# Patient Record
Sex: FEMALE | Race: BLACK OR AFRICAN AMERICAN | NOT HISPANIC OR LATINO | URBAN - METROPOLITAN AREA
[De-identification: names, ages, dates, MRNs, and addresses within clinical notes are randomized per-mention and may not be internally consistent; named-entity substitution may affect disease eponyms.]

---

## 2017-05-11 ENCOUNTER — EMERGENCY (EMERGENCY)
Facility: HOSPITAL | Age: 27
LOS: 1 days | Discharge: HOME | End: 2017-05-11
Attending: EMERGENCY MEDICINE | Admitting: EMERGENCY MEDICINE
Payer: MEDICAID

## 2017-05-11 VITALS
RESPIRATION RATE: 16 BRPM | SYSTOLIC BLOOD PRESSURE: 119 MMHG | OXYGEN SATURATION: 100 % | HEART RATE: 82 BPM | DIASTOLIC BLOOD PRESSURE: 71 MMHG | TEMPERATURE: 98 F

## 2017-05-11 VITALS
OXYGEN SATURATION: 100 % | DIASTOLIC BLOOD PRESSURE: 64 MMHG | TEMPERATURE: 99 F | SYSTOLIC BLOOD PRESSURE: 102 MMHG | RESPIRATION RATE: 16 BRPM | HEART RATE: 82 BPM

## 2017-05-11 LAB
ALBUMIN SERPL ELPH-MCNC: 4 G/DL — SIGNIFICANT CHANGE UP (ref 3.3–5)
ALP SERPL-CCNC: 66 U/L — SIGNIFICANT CHANGE UP (ref 40–120)
ALT FLD-CCNC: 9 U/L — SIGNIFICANT CHANGE UP (ref 4–33)
AST SERPL-CCNC: 16 U/L — SIGNIFICANT CHANGE UP (ref 4–32)
BASOPHILS # BLD AUTO: 0.02 K/UL — SIGNIFICANT CHANGE UP (ref 0–0.2)
BASOPHILS NFR BLD AUTO: 0.3 % — SIGNIFICANT CHANGE UP (ref 0–2)
BILIRUB SERPL-MCNC: 0.3 MG/DL — SIGNIFICANT CHANGE UP (ref 0.2–1.2)
BUN SERPL-MCNC: 9 MG/DL — SIGNIFICANT CHANGE UP (ref 7–23)
CALCIUM SERPL-MCNC: 9.2 MG/DL — SIGNIFICANT CHANGE UP (ref 8.4–10.5)
CHLORIDE SERPL-SCNC: 99 MMOL/L — SIGNIFICANT CHANGE UP (ref 98–107)
CO2 SERPL-SCNC: 23 MMOL/L — SIGNIFICANT CHANGE UP (ref 22–31)
CREAT SERPL-MCNC: 0.8 MG/DL — SIGNIFICANT CHANGE UP (ref 0.5–1.3)
EOSINOPHIL # BLD AUTO: 0.06 K/UL — SIGNIFICANT CHANGE UP (ref 0–0.5)
EOSINOPHIL NFR BLD AUTO: 0.8 % — SIGNIFICANT CHANGE UP (ref 0–6)
GLUCOSE SERPL-MCNC: 74 MG/DL — SIGNIFICANT CHANGE UP (ref 70–99)
HCG SERPL-ACNC: SIGNIFICANT CHANGE UP MIU/ML
HCT VFR BLD CALC: 37.7 % — SIGNIFICANT CHANGE UP (ref 34.5–45)
HGB BLD-MCNC: 12.7 G/DL — SIGNIFICANT CHANGE UP (ref 11.5–15.5)
IMM GRANULOCYTES NFR BLD AUTO: 0.3 % — SIGNIFICANT CHANGE UP (ref 0–1.5)
LYMPHOCYTES # BLD AUTO: 2.77 K/UL — SIGNIFICANT CHANGE UP (ref 1–3.3)
LYMPHOCYTES # BLD AUTO: 37.6 % — SIGNIFICANT CHANGE UP (ref 13–44)
MCHC RBC-ENTMCNC: 32.2 PG — SIGNIFICANT CHANGE UP (ref 27–34)
MCHC RBC-ENTMCNC: 33.7 % — SIGNIFICANT CHANGE UP (ref 32–36)
MCV RBC AUTO: 95.4 FL — SIGNIFICANT CHANGE UP (ref 80–100)
MONOCYTES # BLD AUTO: 0.36 K/UL — SIGNIFICANT CHANGE UP (ref 0–0.9)
MONOCYTES NFR BLD AUTO: 4.9 % — SIGNIFICANT CHANGE UP (ref 2–14)
NEUTROPHILS # BLD AUTO: 4.14 K/UL — SIGNIFICANT CHANGE UP (ref 1.8–7.4)
NEUTROPHILS NFR BLD AUTO: 56.1 % — SIGNIFICANT CHANGE UP (ref 43–77)
PLATELET # BLD AUTO: 358 K/UL — SIGNIFICANT CHANGE UP (ref 150–400)
PMV BLD: 9.6 FL — SIGNIFICANT CHANGE UP (ref 7–13)
POTASSIUM SERPL-MCNC: 3.8 MMOL/L — SIGNIFICANT CHANGE UP (ref 3.5–5.3)
POTASSIUM SERPL-SCNC: 3.8 MMOL/L — SIGNIFICANT CHANGE UP (ref 3.5–5.3)
PROT SERPL-MCNC: 7.6 G/DL — SIGNIFICANT CHANGE UP (ref 6–8.3)
RBC # BLD: 3.95 M/UL — SIGNIFICANT CHANGE UP (ref 3.8–5.2)
RBC # FLD: 13.3 % — SIGNIFICANT CHANGE UP (ref 10.3–14.5)
SODIUM SERPL-SCNC: 136 MMOL/L — SIGNIFICANT CHANGE UP (ref 135–145)
WBC # BLD: 7.37 K/UL — SIGNIFICANT CHANGE UP (ref 3.8–10.5)
WBC # FLD AUTO: 7.37 K/UL — SIGNIFICANT CHANGE UP (ref 3.8–10.5)

## 2017-05-11 PROCEDURE — 76830 TRANSVAGINAL US NON-OB: CPT | Mod: 26

## 2017-05-11 PROCEDURE — 99284 EMERGENCY DEPT VISIT MOD MDM: CPT

## 2017-05-11 RX ORDER — SODIUM CHLORIDE 9 MG/ML
1000 INJECTION INTRAMUSCULAR; INTRAVENOUS; SUBCUTANEOUS ONCE
Qty: 0 | Refills: 0 | Status: COMPLETED | OUTPATIENT
Start: 2017-05-11 | End: 2017-05-11

## 2017-05-11 RX ORDER — METOCLOPRAMIDE HCL 10 MG
10 TABLET ORAL ONCE
Qty: 0 | Refills: 0 | Status: COMPLETED | OUTPATIENT
Start: 2017-05-11 | End: 2017-05-11

## 2017-05-11 RX ADMIN — Medication 10 MILLIGRAM(S): at 23:43

## 2017-05-11 RX ADMIN — SODIUM CHLORIDE 1000 MILLILITER(S): 9 INJECTION INTRAMUSCULAR; INTRAVENOUS; SUBCUTANEOUS at 23:43

## 2017-05-11 NOTE — ED ADULT NURSE NOTE - OBJECTIVE STATEMENT
pt. received in room 9 , A&Ox3 c/o left sided pelvic pain . PT. states she is 13 weeks pregnant and a hx. of miscarriages. Pt. states she has spotting yesterday and denies any spotting right now. Pt. Vitals as noted, and in no acute distress. Pt. IV placed on left ac , labs drawn and sent. Pt. in ultrasound , will continue to monitor while in the ED.

## 2017-05-11 NOTE — ED PROVIDER NOTE - PROGRESS NOTE DETAILS
All results d/w patient and copies given with instructions to bring with them to their follow up appointment.  The patient was given verbal and written discharge instructions Specifically, instructions when to return to the ED and to seek follow-up from their pcp within 1-2 days. Any specialty follow-up was discussed, including how to make an appointment.  Instructions were discussed in simple, plain language and was understood by the patient. The patient understands that should their symptoms worsen or any new symptoms arise, they should return to the ED immediately for further evaluation.  Vss, NAD, tolerating po and ambulating steadily at discharge. Has appt with ob in am

## 2017-05-11 NOTE — ED PROVIDER NOTE - OBJECTIVE STATEMENT
27f presents with vaginal bleeding, now resolved, and abd cramping, , 13 weeks pregnant.  Bleeding started a few days ago, now resolved, today developed abd cramping, lower abdomen, b/l, worse on left side.  Has had persistent vomiting over last few weeks with some generalized weakness. Had us in this pregnancy with iup on us.

## 2017-05-11 NOTE — ED PROVIDER NOTE - ATTENDING CONTRIBUTION TO CARE
27F  at 13wks presents with abdominal cramping and vaginal spotting.  Bleeding stopped today, but had been ongoing for several days.  Lower abd cramping, worse on left.  Has had an u/s with IUP during this pregnancy.  ongoing nausea and vomiting, not worse w cramping.  On exam well appearing, nad, mmm, lungs clear, rrr, abd soft, os closed, no rash, no edema, 2+ pulses, alert, speech clear.  Plan  for labs, u/s, UA.

## 2017-05-11 NOTE — ED PROVIDER NOTE - MEDICAL DECISION MAKING DETAILS
27f, 13 weeks preg w/ confirmed iup, previous bleeding and current cramping, also with previous daily vomiting, will check labs, ua, hydration, antiemetic, ultrasound, reassess.

## 2017-05-11 NOTE — ED ADULT TRIAGE NOTE - CHIEF COMPLAINT QUOTE
Pt st " I am 13 weeks pregnant and having cramping."" I was bleeding alittle 4 days ago...but no bleeding today. HX of [place] x2.

## 2017-05-12 LAB
APPEARANCE UR: SIGNIFICANT CHANGE UP
BILIRUB UR-MCNC: NEGATIVE — SIGNIFICANT CHANGE UP
BLD GP AB SCN SERPL QL: NEGATIVE — SIGNIFICANT CHANGE UP
BLOOD UR QL VISUAL: NEGATIVE — SIGNIFICANT CHANGE UP
COLOR SPEC: YELLOW — SIGNIFICANT CHANGE UP
GLUCOSE UR-MCNC: NEGATIVE — SIGNIFICANT CHANGE UP
KETONES UR-MCNC: NEGATIVE — SIGNIFICANT CHANGE UP
LEUKOCYTE ESTERASE UR-ACNC: NEGATIVE — SIGNIFICANT CHANGE UP
MUCOUS THREADS # UR AUTO: SIGNIFICANT CHANGE UP
NITRITE UR-MCNC: NEGATIVE — SIGNIFICANT CHANGE UP
PH UR: 7 — SIGNIFICANT CHANGE UP (ref 4.6–8)
PROT UR-MCNC: 30 — HIGH
RBC CASTS # UR COMP ASSIST: SIGNIFICANT CHANGE UP (ref 0–?)
RH IG SCN BLD-IMP: POSITIVE — SIGNIFICANT CHANGE UP
SP GR SPEC: 1.03 — HIGH (ref 1–1.03)
SQUAMOUS # UR AUTO: SIGNIFICANT CHANGE UP
UROBILINOGEN FLD QL: NORMAL E.U. — SIGNIFICANT CHANGE UP (ref 0.1–0.2)
WBC UR QL: SIGNIFICANT CHANGE UP (ref 0–?)

## 2017-09-25 ENCOUNTER — OUTPATIENT (OUTPATIENT)
Dept: OUTPATIENT SERVICES | Facility: HOSPITAL | Age: 27
LOS: 1 days | End: 2017-09-25
Payer: MEDICAID

## 2017-09-25 DIAGNOSIS — Z3A.00 WEEKS OF GESTATION OF PREGNANCY NOT SPECIFIED: ICD-10-CM

## 2017-09-25 DIAGNOSIS — O26.899 OTHER SPECIFIED PREGNANCY RELATED CONDITIONS, UNSPECIFIED TRIMESTER: ICD-10-CM

## 2017-09-25 LAB
APPEARANCE UR: CLEAR — SIGNIFICANT CHANGE UP
BASOPHILS # BLD AUTO: 0.1 K/UL — SIGNIFICANT CHANGE UP (ref 0–0.2)
BASOPHILS NFR BLD AUTO: 0.7 % — SIGNIFICANT CHANGE UP (ref 0–2)
BILIRUB UR-MCNC: NEGATIVE — SIGNIFICANT CHANGE UP
COLOR SPEC: YELLOW — SIGNIFICANT CHANGE UP
DIFF PNL FLD: NEGATIVE — SIGNIFICANT CHANGE UP
EOSINOPHIL # BLD AUTO: 0 K/UL — SIGNIFICANT CHANGE UP (ref 0–0.5)
EOSINOPHIL NFR BLD AUTO: 0.2 % — SIGNIFICANT CHANGE UP (ref 0–6)
GLUCOSE UR QL: NEGATIVE — SIGNIFICANT CHANGE UP
HCT VFR BLD CALC: 31.1 % — LOW (ref 34.5–45)
HGB BLD-MCNC: 10.5 G/DL — LOW (ref 11.5–15.5)
KETONES UR-MCNC: NEGATIVE — SIGNIFICANT CHANGE UP
LEUKOCYTE ESTERASE UR-ACNC: ABNORMAL
LYMPHOCYTES # BLD AUTO: 1.9 K/UL — SIGNIFICANT CHANGE UP (ref 1–3.3)
LYMPHOCYTES # BLD AUTO: 23.8 % — SIGNIFICANT CHANGE UP (ref 13–44)
MCHC RBC-ENTMCNC: 31.7 PG — SIGNIFICANT CHANGE UP (ref 27–34)
MCHC RBC-ENTMCNC: 33.7 GM/DL — SIGNIFICANT CHANGE UP (ref 32–36)
MCV RBC AUTO: 94 FL — SIGNIFICANT CHANGE UP (ref 80–100)
MONOCYTES # BLD AUTO: 0.6 K/UL — SIGNIFICANT CHANGE UP (ref 0–0.9)
MONOCYTES NFR BLD AUTO: 7.3 % — SIGNIFICANT CHANGE UP (ref 2–14)
NEUTROPHILS # BLD AUTO: 5.3 K/UL — SIGNIFICANT CHANGE UP (ref 1.8–7.4)
NEUTROPHILS NFR BLD AUTO: 68 % — SIGNIFICANT CHANGE UP (ref 43–77)
NITRITE UR-MCNC: NEGATIVE — SIGNIFICANT CHANGE UP
PH UR: 6.5 — SIGNIFICANT CHANGE UP (ref 5–8)
PLATELET # BLD AUTO: 339 K/UL — SIGNIFICANT CHANGE UP (ref 150–400)
PROT UR-MCNC: NEGATIVE — SIGNIFICANT CHANGE UP
RBC # BLD: 3.31 M/UL — LOW (ref 3.8–5.2)
RBC # FLD: 12.2 % — SIGNIFICANT CHANGE UP (ref 10.3–14.5)
SP GR SPEC: 1.01 — SIGNIFICANT CHANGE UP (ref 1.01–1.02)
UROBILINOGEN FLD QL: 1
WBC # BLD: 7.8 K/UL — SIGNIFICANT CHANGE UP (ref 3.8–10.5)
WBC # FLD AUTO: 7.8 K/UL — SIGNIFICANT CHANGE UP (ref 3.8–10.5)

## 2017-09-25 PROCEDURE — G0463: CPT

## 2017-09-25 PROCEDURE — 81001 URINALYSIS AUTO W/SCOPE: CPT

## 2017-09-25 PROCEDURE — 85027 COMPLETE CBC AUTOMATED: CPT

## 2017-09-25 PROCEDURE — 59025 FETAL NON-STRESS TEST: CPT

## 2017-09-25 RX ORDER — SODIUM CHLORIDE 9 MG/ML
1000 INJECTION, SOLUTION INTRAVENOUS
Qty: 0 | Refills: 0 | Status: DISCONTINUED | OUTPATIENT
Start: 2017-09-25 | End: 2017-10-10

## 2017-09-25 RX ORDER — METRONIDAZOLE 7.5 MG/G
1 GEL VAGINAL
Qty: 1 | Refills: 0 | OUTPATIENT
Start: 2017-09-25 | End: 2017-09-30

## 2017-11-10 ENCOUNTER — OUTPATIENT (OUTPATIENT)
Dept: OUTPATIENT SERVICES | Facility: HOSPITAL | Age: 27
LOS: 1 days | End: 2017-11-10
Payer: MEDICAID

## 2017-11-10 DIAGNOSIS — Z3A.00 WEEKS OF GESTATION OF PREGNANCY NOT SPECIFIED: ICD-10-CM

## 2017-11-10 DIAGNOSIS — O26.899 OTHER SPECIFIED PREGNANCY RELATED CONDITIONS, UNSPECIFIED TRIMESTER: ICD-10-CM

## 2017-11-10 PROCEDURE — 99213 OFFICE O/P EST LOW 20 MIN: CPT

## 2017-11-13 ENCOUNTER — INPATIENT (INPATIENT)
Facility: HOSPITAL | Age: 27
LOS: 1 days | Discharge: HOME | End: 2017-11-15
Attending: OBSTETRICS & GYNECOLOGY | Admitting: OBSTETRICS & GYNECOLOGY

## 2017-11-13 VITALS — WEIGHT: 218.26 LBS | HEIGHT: 71 IN

## 2017-11-13 DIAGNOSIS — O26.90 PREGNANCY RELATED CONDITIONS, UNSPECIFIED, UNSPECIFIED TRIMESTER: ICD-10-CM

## 2017-11-13 DIAGNOSIS — Z3A.00 WEEKS OF GESTATION OF PREGNANCY NOT SPECIFIED: ICD-10-CM

## 2017-11-13 LAB
BASOPHILS # BLD AUTO: 0.03 K/UL — SIGNIFICANT CHANGE UP (ref 0–0.2)
BASOPHILS NFR BLD AUTO: 0.4 % — SIGNIFICANT CHANGE UP (ref 0–2)
BLD GP AB SCN SERPL QL: NEGATIVE — SIGNIFICANT CHANGE UP
EOSINOPHIL # BLD AUTO: 0.1 K/UL — SIGNIFICANT CHANGE UP (ref 0–0.5)
EOSINOPHIL NFR BLD AUTO: 1.4 % — SIGNIFICANT CHANGE UP (ref 0–6)
HCT VFR BLD CALC: 30.4 % — LOW (ref 34.5–45)
HGB BLD-MCNC: 9.8 G/DL — LOW (ref 11.5–15.5)
IMM GRANULOCYTES # BLD AUTO: 0.05 # — SIGNIFICANT CHANGE UP
IMM GRANULOCYTES NFR BLD AUTO: 0.7 % — SIGNIFICANT CHANGE UP (ref 0–1.5)
LYMPHOCYTES # BLD AUTO: 1.93 K/UL — SIGNIFICANT CHANGE UP (ref 1–3.3)
LYMPHOCYTES # BLD AUTO: 26.1 % — SIGNIFICANT CHANGE UP (ref 13–44)
MCHC RBC-ENTMCNC: 30.1 PG — SIGNIFICANT CHANGE UP (ref 27–34)
MCHC RBC-ENTMCNC: 32.2 % — SIGNIFICANT CHANGE UP (ref 32–36)
MCV RBC AUTO: 93.3 FL — SIGNIFICANT CHANGE UP (ref 80–100)
MONOCYTES # BLD AUTO: 0.62 K/UL — SIGNIFICANT CHANGE UP (ref 0–0.9)
MONOCYTES NFR BLD AUTO: 8.4 % — SIGNIFICANT CHANGE UP (ref 2–14)
NEUTROPHILS # BLD AUTO: 4.66 K/UL — SIGNIFICANT CHANGE UP (ref 1.8–7.4)
NEUTROPHILS NFR BLD AUTO: 63 % — SIGNIFICANT CHANGE UP (ref 43–77)
NRBC # FLD: 0 — SIGNIFICANT CHANGE UP
PLATELET # BLD AUTO: 379 K/UL — SIGNIFICANT CHANGE UP (ref 150–400)
PMV BLD: 10 FL — SIGNIFICANT CHANGE UP (ref 7–13)
RBC # BLD: 3.26 M/UL — LOW (ref 3.8–5.2)
RBC # FLD: 14.4 % — SIGNIFICANT CHANGE UP (ref 10.3–14.5)
RH IG SCN BLD-IMP: POSITIVE — SIGNIFICANT CHANGE UP
WBC # BLD: 7.39 K/UL — SIGNIFICANT CHANGE UP (ref 3.8–10.5)
WBC # FLD AUTO: 7.39 K/UL — SIGNIFICANT CHANGE UP (ref 3.8–10.5)

## 2017-11-13 RX ORDER — OXYTOCIN 10 UNIT/ML
333.33 VIAL (ML) INJECTION
Qty: 20 | Refills: 0 | Status: COMPLETED | OUTPATIENT
Start: 2017-11-13

## 2017-11-13 RX ORDER — IBUPROFEN 200 MG
600 TABLET ORAL EVERY 6 HOURS
Qty: 0 | Refills: 0 | Status: DISCONTINUED | OUTPATIENT
Start: 2017-11-13 | End: 2017-11-15

## 2017-11-13 RX ORDER — SODIUM CHLORIDE 9 MG/ML
1000 INJECTION, SOLUTION INTRAVENOUS ONCE
Qty: 0 | Refills: 0 | Status: DISCONTINUED | OUTPATIENT
Start: 2017-11-13 | End: 2017-11-13

## 2017-11-13 RX ORDER — HYDROCORTISONE 1 %
1 OINTMENT (GRAM) TOPICAL EVERY 4 HOURS
Qty: 0 | Refills: 0 | Status: DISCONTINUED | OUTPATIENT
Start: 2017-11-13 | End: 2017-11-13

## 2017-11-13 RX ORDER — IBUPROFEN 200 MG
600 TABLET ORAL EVERY 6 HOURS
Qty: 0 | Refills: 0 | Status: COMPLETED | OUTPATIENT
Start: 2017-11-13 | End: 2018-10-12

## 2017-11-13 RX ORDER — OXYCODONE HYDROCHLORIDE 5 MG/1
5 TABLET ORAL EVERY 4 HOURS
Qty: 0 | Refills: 0 | Status: DISCONTINUED | OUTPATIENT
Start: 2017-11-13 | End: 2017-11-15

## 2017-11-13 RX ORDER — AMPICILLIN TRIHYDRATE 250 MG
2 CAPSULE ORAL ONCE
Qty: 0 | Refills: 0 | Status: COMPLETED | OUTPATIENT
Start: 2017-11-13 | End: 2017-11-13

## 2017-11-13 RX ORDER — PRAMOXINE HYDROCHLORIDE 150 MG/15G
1 AEROSOL, FOAM RECTAL EVERY 4 HOURS
Qty: 0 | Refills: 0 | Status: DISCONTINUED | OUTPATIENT
Start: 2017-11-13 | End: 2017-11-13

## 2017-11-13 RX ORDER — SODIUM CHLORIDE 9 MG/ML
3 INJECTION INTRAMUSCULAR; INTRAVENOUS; SUBCUTANEOUS EVERY 8 HOURS
Qty: 0 | Refills: 0 | Status: DISCONTINUED | OUTPATIENT
Start: 2017-11-13 | End: 2017-11-15

## 2017-11-13 RX ORDER — GLYCERIN ADULT
1 SUPPOSITORY, RECTAL RECTAL AT BEDTIME
Qty: 0 | Refills: 0 | Status: DISCONTINUED | OUTPATIENT
Start: 2017-11-13 | End: 2017-11-15

## 2017-11-13 RX ORDER — AER TRAVELER 0.5 G/1
1 SOLUTION RECTAL; TOPICAL EVERY 4 HOURS
Qty: 0 | Refills: 0 | Status: DISCONTINUED | OUTPATIENT
Start: 2017-11-13 | End: 2017-11-15

## 2017-11-13 RX ORDER — OXYCODONE HYDROCHLORIDE 5 MG/1
5 TABLET ORAL
Qty: 0 | Refills: 0 | Status: DISCONTINUED | OUTPATIENT
Start: 2017-11-13 | End: 2017-11-15

## 2017-11-13 RX ORDER — LANOLIN
1 OINTMENT (GRAM) TOPICAL EVERY 6 HOURS
Qty: 0 | Refills: 0 | Status: DISCONTINUED | OUTPATIENT
Start: 2017-11-13 | End: 2017-11-15

## 2017-11-13 RX ORDER — HYDROCORTISONE 1 %
1 OINTMENT (GRAM) TOPICAL EVERY 4 HOURS
Qty: 0 | Refills: 0 | Status: DISCONTINUED | OUTPATIENT
Start: 2017-11-13 | End: 2017-11-14

## 2017-11-13 RX ORDER — ACETAMINOPHEN 500 MG
975 TABLET ORAL EVERY 6 HOURS
Qty: 0 | Refills: 0 | Status: DISCONTINUED | OUTPATIENT
Start: 2017-11-13 | End: 2017-11-15

## 2017-11-13 RX ORDER — SODIUM CHLORIDE 9 MG/ML
1000 INJECTION, SOLUTION INTRAVENOUS
Qty: 0 | Refills: 0 | Status: DISCONTINUED | OUTPATIENT
Start: 2017-11-13 | End: 2017-11-13

## 2017-11-13 RX ORDER — MAGNESIUM HYDROXIDE 400 MG/1
30 TABLET, CHEWABLE ORAL
Qty: 0 | Refills: 0 | Status: DISCONTINUED | OUTPATIENT
Start: 2017-11-13 | End: 2017-11-15

## 2017-11-13 RX ORDER — DOCUSATE SODIUM 100 MG
100 CAPSULE ORAL
Qty: 0 | Refills: 0 | Status: DISCONTINUED | OUTPATIENT
Start: 2017-11-13 | End: 2017-11-14

## 2017-11-13 RX ORDER — OXYTOCIN 10 UNIT/ML
41.67 VIAL (ML) INJECTION
Qty: 20 | Refills: 0 | Status: DISCONTINUED | OUTPATIENT
Start: 2017-11-13 | End: 2017-11-14

## 2017-11-13 RX ORDER — SODIUM CHLORIDE 9 MG/ML
3 INJECTION INTRAMUSCULAR; INTRAVENOUS; SUBCUTANEOUS EVERY 8 HOURS
Qty: 0 | Refills: 0 | Status: DISCONTINUED | OUTPATIENT
Start: 2017-11-13 | End: 2017-11-13

## 2017-11-13 RX ORDER — ACETAMINOPHEN 500 MG
975 TABLET ORAL EVERY 6 HOURS
Qty: 0 | Refills: 0 | Status: COMPLETED | OUTPATIENT
Start: 2017-11-13 | End: 2018-10-12

## 2017-11-13 RX ORDER — OXYTOCIN 10 UNIT/ML
333.33 VIAL (ML) INJECTION
Qty: 20 | Refills: 0 | Status: COMPLETED | OUTPATIENT
Start: 2017-11-13 | End: 2017-11-13

## 2017-11-13 RX ORDER — SIMETHICONE 80 MG/1
80 TABLET, CHEWABLE ORAL EVERY 6 HOURS
Qty: 0 | Refills: 0 | Status: DISCONTINUED | OUTPATIENT
Start: 2017-11-13 | End: 2017-11-15

## 2017-11-13 RX ORDER — DIBUCAINE 1 %
1 OINTMENT (GRAM) RECTAL EVERY 4 HOURS
Qty: 0 | Refills: 0 | Status: DISCONTINUED | OUTPATIENT
Start: 2017-11-13 | End: 2017-11-15

## 2017-11-13 RX ORDER — AMPICILLIN TRIHYDRATE 250 MG
CAPSULE ORAL
Qty: 0 | Refills: 0 | Status: DISCONTINUED | OUTPATIENT
Start: 2017-11-13 | End: 2017-11-13

## 2017-11-13 RX ORDER — PRAMOXINE HYDROCHLORIDE 150 MG/15G
1 AEROSOL, FOAM RECTAL EVERY 4 HOURS
Qty: 0 | Refills: 0 | Status: DISCONTINUED | OUTPATIENT
Start: 2017-11-13 | End: 2017-11-14

## 2017-11-13 RX ORDER — DIBUCAINE 1 %
1 OINTMENT (GRAM) RECTAL EVERY 4 HOURS
Qty: 0 | Refills: 0 | Status: DISCONTINUED | OUTPATIENT
Start: 2017-11-13 | End: 2017-11-13

## 2017-11-13 RX ORDER — TETANUS TOXOID, REDUCED DIPHTHERIA TOXOID AND ACELLULAR PERTUSSIS VACCINE, ADSORBED 5; 2.5; 8; 8; 2.5 [IU]/.5ML; [IU]/.5ML; UG/.5ML; UG/.5ML; UG/.5ML
0.5 SUSPENSION INTRAMUSCULAR ONCE
Qty: 0 | Refills: 0 | Status: DISCONTINUED | OUTPATIENT
Start: 2017-11-13 | End: 2017-11-15

## 2017-11-13 RX ORDER — INFLUENZA VIRUS VACCINE 15; 15; 15; 15 UG/.5ML; UG/.5ML; UG/.5ML; UG/.5ML
0.5 SUSPENSION INTRAMUSCULAR ONCE
Qty: 0 | Refills: 0 | Status: COMPLETED | OUTPATIENT
Start: 2017-11-13 | End: 2017-11-13

## 2017-11-13 RX ORDER — AER TRAVELER 0.5 G/1
1 SOLUTION RECTAL; TOPICAL EVERY 4 HOURS
Qty: 0 | Refills: 0 | Status: DISCONTINUED | OUTPATIENT
Start: 2017-11-13 | End: 2017-11-13

## 2017-11-13 RX ORDER — AMPICILLIN TRIHYDRATE 250 MG
1 CAPSULE ORAL EVERY 4 HOURS
Qty: 0 | Refills: 0 | Status: DISCONTINUED | OUTPATIENT
Start: 2017-11-13 | End: 2017-11-13

## 2017-11-13 RX ORDER — DIPHENHYDRAMINE HCL 50 MG
25 CAPSULE ORAL EVERY 6 HOURS
Qty: 0 | Refills: 0 | Status: DISCONTINUED | OUTPATIENT
Start: 2017-11-13 | End: 2017-11-15

## 2017-11-13 RX ORDER — OXYTOCIN 10 UNIT/ML
41.67 VIAL (ML) INJECTION
Qty: 20 | Refills: 0 | Status: DISCONTINUED | OUTPATIENT
Start: 2017-11-13 | End: 2017-11-13

## 2017-11-13 RX ORDER — KETOROLAC TROMETHAMINE 30 MG/ML
30 SYRINGE (ML) INJECTION ONCE
Qty: 0 | Refills: 0 | Status: DISCONTINUED | OUTPATIENT
Start: 2017-11-13 | End: 2017-11-13

## 2017-11-13 RX ADMIN — Medication 125 MILLIUNIT(S)/MIN: at 08:44

## 2017-11-13 RX ADMIN — Medication 975 MILLIGRAM(S): at 11:10

## 2017-11-13 RX ADMIN — Medication 975 MILLIGRAM(S): at 21:45

## 2017-11-13 RX ADMIN — Medication 600 MILLIGRAM(S): at 18:05

## 2017-11-13 RX ADMIN — SODIUM CHLORIDE 3 MILLILITER(S): 9 INJECTION INTRAMUSCULAR; INTRAVENOUS; SUBCUTANEOUS at 12:40

## 2017-11-13 RX ADMIN — Medication 1000 MILLIUNIT(S)/MIN: at 08:20

## 2017-11-13 RX ADMIN — SODIUM CHLORIDE 3 MILLILITER(S): 9 INJECTION INTRAMUSCULAR; INTRAVENOUS; SUBCUTANEOUS at 22:04

## 2017-11-13 RX ADMIN — Medication 975 MILLIGRAM(S): at 21:15

## 2017-11-13 RX ADMIN — OXYCODONE HYDROCHLORIDE 5 MILLIGRAM(S): 5 TABLET ORAL at 21:15

## 2017-11-13 RX ADMIN — OXYCODONE HYDROCHLORIDE 5 MILLIGRAM(S): 5 TABLET ORAL at 13:15

## 2017-11-13 RX ADMIN — OXYCODONE HYDROCHLORIDE 5 MILLIGRAM(S): 5 TABLET ORAL at 12:35

## 2017-11-13 RX ADMIN — Medication 30 MILLIGRAM(S): at 09:20

## 2017-11-13 RX ADMIN — Medication 30 MILLIGRAM(S): at 09:04

## 2017-11-13 RX ADMIN — Medication 216 GRAM(S): at 05:59

## 2017-11-13 RX ADMIN — Medication 600 MILLIGRAM(S): at 19:00

## 2017-11-13 RX ADMIN — OXYCODONE HYDROCHLORIDE 5 MILLIGRAM(S): 5 TABLET ORAL at 21:45

## 2017-11-13 RX ADMIN — Medication 975 MILLIGRAM(S): at 11:40

## 2017-11-14 ENCOUNTER — TRANSCRIPTION ENCOUNTER (OUTPATIENT)
Age: 27
End: 2017-11-14

## 2017-11-14 LAB — T PALLIDUM AB TITR SER: NEGATIVE — SIGNIFICANT CHANGE UP

## 2017-11-14 RX ORDER — DOCUSATE SODIUM 100 MG
100 CAPSULE ORAL THREE TIMES A DAY
Qty: 0 | Refills: 0 | Status: DISCONTINUED | OUTPATIENT
Start: 2017-11-14 | End: 2017-11-15

## 2017-11-14 RX ORDER — FERROUS SULFATE 325(65) MG
325 TABLET ORAL
Qty: 0 | Refills: 0 | Status: DISCONTINUED | OUTPATIENT
Start: 2017-11-14 | End: 2017-11-15

## 2017-11-14 RX ORDER — DOCUSATE SODIUM 100 MG
100 CAPSULE ORAL
Qty: 0 | Refills: 0 | Status: DISCONTINUED | OUTPATIENT
Start: 2017-11-14 | End: 2017-11-14

## 2017-11-14 RX ORDER — ASCORBIC ACID 60 MG
500 TABLET,CHEWABLE ORAL DAILY
Qty: 0 | Refills: 0 | Status: DISCONTINUED | OUTPATIENT
Start: 2017-11-14 | End: 2017-11-15

## 2017-11-14 RX ADMIN — Medication 325 MILLIGRAM(S): at 14:12

## 2017-11-14 RX ADMIN — Medication 1 TABLET(S): at 14:12

## 2017-11-14 RX ADMIN — Medication 975 MILLIGRAM(S): at 15:00

## 2017-11-14 RX ADMIN — Medication 600 MILLIGRAM(S): at 22:58

## 2017-11-14 RX ADMIN — Medication 975 MILLIGRAM(S): at 14:11

## 2017-11-14 RX ADMIN — Medication 600 MILLIGRAM(S): at 11:20

## 2017-11-14 RX ADMIN — Medication 100 MILLIGRAM(S): at 11:02

## 2017-11-14 RX ADMIN — Medication 600 MILLIGRAM(S): at 22:17

## 2017-11-14 RX ADMIN — Medication 100 MILLIGRAM(S): at 06:22

## 2017-11-14 RX ADMIN — Medication 600 MILLIGRAM(S): at 02:00

## 2017-11-14 RX ADMIN — Medication 975 MILLIGRAM(S): at 22:13

## 2017-11-14 RX ADMIN — Medication 600 MILLIGRAM(S): at 01:57

## 2017-11-14 RX ADMIN — Medication 975 MILLIGRAM(S): at 22:58

## 2017-11-14 RX ADMIN — Medication 325 MILLIGRAM(S): at 08:30

## 2017-11-14 RX ADMIN — SODIUM CHLORIDE 3 MILLILITER(S): 9 INJECTION INTRAMUSCULAR; INTRAVENOUS; SUBCUTANEOUS at 06:23

## 2017-11-14 RX ADMIN — Medication 500 MILLIGRAM(S): at 14:12

## 2017-11-14 RX ADMIN — Medication 600 MILLIGRAM(S): at 10:40

## 2017-11-14 NOTE — DISCHARGE NOTE OB - MATERIALS PROVIDED
Breastfeeding Mother’s Support Group Information/Guide to Postpartum Care/Shaken Baby Prevention Handout/Birth Certificate Instructions/Breastfeeding Log/Back To Sleep Handout/[place]  Immunization Record/Bottle Feeding Log/[facility] [place] Screening Program/[facility] Hearing Screen Program

## 2017-11-14 NOTE — DISCHARGE NOTE OB - MEDICATION SUMMARY - MEDICATIONS TO STOP TAKING
I will STOP taking the medications listed below when I get home from the hospital:    MetroGel-Vaginal 0.75% vaginal gel with applicator  -- 1 applicatorful vaginally 2 times a day   -- For vaginal use.

## 2017-11-14 NOTE — DISCHARGE NOTE OB - CARE PROVIDER_API CALL
Ana M Akers), Obstetrics and Gynecology  [address]  [address]  [place] [address]  Phone: [phone]  Fax: [phone]

## 2017-11-14 NOTE — DISCHARGE NOTE OB - PATIENT PORTAL LINK FT
“You can access the [facility] Patient Portal, offered by [facility], by registering with the following website: [email or web address]”

## 2017-11-15 VITALS
OXYGEN SATURATION: 100 % | TEMPERATURE: 98 F | SYSTOLIC BLOOD PRESSURE: 117 MMHG | RESPIRATION RATE: 18 BRPM | HEART RATE: 80 BPM | DIASTOLIC BLOOD PRESSURE: 72 MMHG

## 2017-11-15 RX ORDER — ACETAMINOPHEN 500 MG
3 TABLET ORAL
Qty: 0 | Refills: 0 | DISCHARGE
Start: 2017-11-15

## 2017-11-15 RX ORDER — IBUPROFEN 200 MG
1 TABLET ORAL
Qty: 0 | Refills: 0 | DISCHARGE
Start: 2017-11-15

## 2017-11-15 NOTE — PROGRESS NOTE ADULT - PROBLEM SELECTOR PLAN 1
- Pain well controlled, continue current pain regimen  - Increase ambulation, SCDs when not ambulating  - Continue colace   - Continue regular diet    Maria Victoria Blanco D.O. PGY1
- Pain well controlled, continue current pain regimen  - Increase ambulation, SCDs when not ambulating  - Continue regular diet  - Discharge planning     Maria Victoria Blanco DO PGY1

## 2017-11-15 NOTE — PROGRESS NOTE ADULT - SUBJECTIVE AND OBJECTIVE BOX
OB Progress Note:  PPD#1    S: 26yo PPD#1 s/p . Patient feels well. Pain is well controlled. She is tolerating a regular diet and not yet passing flatus. She is voiding spontaneously, and ambulating without difficulty. Denies CP/SOB. Denies lightheadedness/dizziness. Denies N/V.    O:  Vitals:  Vital Signs Last 24 Hrs  T(C): 36.7 (2017 05:58), Max: 37.2 (2017 10:45)  T(F): 98 (2017 05:58), Max: 98.9 (2017 10:45)  HR: 81 (2017 05:58) (78 - 90)  BP: 114/70 (2017 05:58) (113/70 - 141/89)  BP(mean): --  RR: 18 (2017 05:58) (17 - 18)  SpO2: 100% (2017 05:58) (98% - 100%)    MEDICATIONS  (STANDING):  acetaminophen   Tablet. 975 milliGRAM(s) Oral every 6 hours  ascorbic acid 500 milliGRAM(s) Oral daily  diphtheria/tetanus/pertussis (acellular) Vaccine (ADAcel) 0.5 milliLiter(s) IntraMuscular once  docusate sodium 100 milliGRAM(s) Oral three times a day  ferrous    sulfate 325 milliGRAM(s) Oral three times a day with meals  ibuprofen  Tablet 600 milliGRAM(s) Oral every 6 hours  oxyCODONE    IR 5 milliGRAM(s) Oral every 3 hours  prenatal multivitamin 1 Tablet(s) Oral daily  sodium chloride 0.9% lock flush 3 milliLiter(s) IV Push every 8 hours      Labs:  Blood type: O Positive  Rubella IgG: RPR: Negative                          9.8<L>   7.39 >-----------< 379    ( 13 @ 06:40 )             30.4<L>                  Physical Exam:  General: NAD  Abdomen: soft, non-tender, non-distended, fundus firm  Vaginal: Lochia wnl  Extremities: NTBL
OB Progress Note:  PPD#2    S: 28yo  PPD#2 s/p . Patient feels well. Pain is well controlled. She is tolerating a regular diet and passing flatus. She is voiding spontaneously, and ambulating without difficulty. Denies CP/SOB. Denies lightheadedness/dizziness. Denies N/V.    O:  Vitals:   Vital Signs Last 24 Hrs  T(C): 36.4 (15 Nov 2017 05:17), Max: 36.8 (2017 18:14)  T(F): 97.5 (15 Nov 2017 05:17), Max: 98.2 (2017 18:14)  HR: 80 (15 Nov 2017 05:17) (77 - 80)  BP: 117/72 (15 Nov 2017 05:17) (117/72 - 122/67)  BP(mean): --  RR: 18 (15 Nov 2017 05:17) (17 - 18)  SpO2: 100% (15 Nov 2017 05:17) (100% - 100%)    MEDICATIONS  (STANDING):  acetaminophen   Tablet. 975 milliGRAM(s) Oral every 6 hours  ascorbic acid 500 milliGRAM(s) Oral daily  diphtheria/tetanus/pertussis (acellular) Vaccine (ADAcel) 0.5 milliLiter(s) IntraMuscular once  docusate sodium 100 milliGRAM(s) Oral three times a day  ferrous    sulfate 325 milliGRAM(s) Oral three times a day with meals  ibuprofen  Tablet 600 milliGRAM(s) Oral every 6 hours  oxyCODONE    IR 5 milliGRAM(s) Oral every 3 hours  prenatal multivitamin 1 Tablet(s) Oral daily  sodium chloride 0.9% lock flush 3 milliLiter(s) IV Push every 8 hours    MEDICATIONS  (PRN):  dibucaine 1% Ointment 1 Application(s) Topical every 4 hours PRN Perineal Discomfort  diphenhydrAMINE   Capsule 25 milliGRAM(s) Oral every 6 hours PRN Itching  glycerin Suppository - Adult 1 Suppository(s) Rectal at bedtime PRN Constipation  lanolin Ointment 1 Application(s) Topical every 6 hours PRN Sore Nipples  magnesium hydroxide Suspension 30 milliLiter(s) Oral two times a day PRN Constipation  oxyCODONE    IR 5 milliGRAM(s) Oral every 4 hours PRN Severe Pain (7 -10)  simethicone 80 milliGRAM(s) Chew every 6 hours PRN Gas  witch hazel Pads 1 Application(s) Topical every 4 hours PRN Perineal Discomfort      Labs:  Blood type: O Positive  Rubella IgG: RPR: Negative                          9.8<L>   7.39 >-----------< 379    ( 11-13 @ 06:40 )             30.4<L>                  Physical Exam:  General: NAD  Abdomen: soft, non-tender, non-distended, fundus firm  Vaginal: Lochia wnl  Extremities: NTBL

## 2019-01-04 NOTE — PROGRESS NOTE ADULT - ASSESSMENT
A/P: 28yo  PPD#1 s/p .
A/P: 26yo  PPD#2 s/p .  Patient is stable and doing well post-partum.
EKG completed

## 2019-02-15 ENCOUNTER — APPOINTMENT (OUTPATIENT)
Dept: ANTEPARTUM | Facility: CLINIC | Age: 29
End: 2019-02-15
Payer: COMMERCIAL

## 2019-02-15 ENCOUNTER — ASOB RESULT (OUTPATIENT)
Age: 29
End: 2019-02-15

## 2019-02-15 PROCEDURE — 76813 OB US NUCHAL MEAS 1 GEST: CPT

## 2019-02-15 PROCEDURE — 76801 OB US < 14 WKS SINGLE FETUS: CPT

## 2019-02-15 PROCEDURE — 36416 COLLJ CAPILLARY BLOOD SPEC: CPT

## 2019-04-22 ENCOUNTER — APPOINTMENT (OUTPATIENT)
Dept: ANTEPARTUM | Facility: CLINIC | Age: 29
End: 2019-04-22
Payer: MEDICAID

## 2019-04-22 ENCOUNTER — ASOB RESULT (OUTPATIENT)
Age: 29
End: 2019-04-22

## 2019-04-22 PROCEDURE — 76805 OB US >/= 14 WKS SNGL FETUS: CPT

## 2019-08-23 ENCOUNTER — TRANSCRIPTION ENCOUNTER (OUTPATIENT)
Age: 29
End: 2019-08-23

## 2019-08-23 ENCOUNTER — INPATIENT (INPATIENT)
Facility: HOSPITAL | Age: 29
LOS: 1 days | Discharge: HOME | End: 2019-08-25
Attending: OBSTETRICS & GYNECOLOGY | Admitting: OBSTETRICS & GYNECOLOGY

## 2019-08-23 VITALS
HEART RATE: 84 BPM | TEMPERATURE: 98 F | SYSTOLIC BLOOD PRESSURE: 115 MMHG | DIASTOLIC BLOOD PRESSURE: 72 MMHG | RESPIRATION RATE: 16 BRPM

## 2019-08-23 DIAGNOSIS — Z98.890 OTHER SPECIFIED POSTPROCEDURAL STATES: Chronic | ICD-10-CM

## 2019-08-23 DIAGNOSIS — O26.899 OTHER SPECIFIED PREGNANCY RELATED CONDITIONS, UNSPECIFIED TRIMESTER: ICD-10-CM

## 2019-08-23 DIAGNOSIS — Z3A.00 WEEKS OF GESTATION OF PREGNANCY NOT SPECIFIED: ICD-10-CM

## 2019-08-23 LAB
BASOPHILS # BLD AUTO: 0.03 K/UL — SIGNIFICANT CHANGE UP (ref 0–0.2)
BASOPHILS NFR BLD AUTO: 0.5 % — SIGNIFICANT CHANGE UP (ref 0–2)
BLD GP AB SCN SERPL QL: NEGATIVE — SIGNIFICANT CHANGE UP
EOSINOPHIL # BLD AUTO: 0.04 K/UL — SIGNIFICANT CHANGE UP (ref 0–0.5)
EOSINOPHIL NFR BLD AUTO: 0.6 % — SIGNIFICANT CHANGE UP (ref 0–6)
HCT VFR BLD CALC: 35.4 % — SIGNIFICANT CHANGE UP (ref 34.5–45)
HGB BLD-MCNC: 11.2 G/DL — LOW (ref 11.5–15.5)
IMM GRANULOCYTES NFR BLD AUTO: 0.3 % — SIGNIFICANT CHANGE UP (ref 0–1.5)
LYMPHOCYTES # BLD AUTO: 2.14 K/UL — SIGNIFICANT CHANGE UP (ref 1–3.3)
LYMPHOCYTES # BLD AUTO: 34.2 % — SIGNIFICANT CHANGE UP (ref 13–44)
MCHC RBC-ENTMCNC: 28.7 PG — SIGNIFICANT CHANGE UP (ref 27–34)
MCHC RBC-ENTMCNC: 31.6 % — LOW (ref 32–36)
MCV RBC AUTO: 90.8 FL — SIGNIFICANT CHANGE UP (ref 80–100)
MONOCYTES # BLD AUTO: 0.63 K/UL — SIGNIFICANT CHANGE UP (ref 0–0.9)
MONOCYTES NFR BLD AUTO: 10.1 % — SIGNIFICANT CHANGE UP (ref 2–14)
NEUTROPHILS # BLD AUTO: 3.39 K/UL — SIGNIFICANT CHANGE UP (ref 1.8–7.4)
NEUTROPHILS NFR BLD AUTO: 54.3 % — SIGNIFICANT CHANGE UP (ref 43–77)
NRBC # FLD: 0 K/UL — SIGNIFICANT CHANGE UP (ref 0–0)
PLATELET # BLD AUTO: 344 K/UL — SIGNIFICANT CHANGE UP (ref 150–400)
PMV BLD: 9.9 FL — SIGNIFICANT CHANGE UP (ref 7–13)
RBC # BLD: 3.9 M/UL — SIGNIFICANT CHANGE UP (ref 3.8–5.2)
RBC # FLD: 14.7 % — HIGH (ref 10.3–14.5)
RH IG SCN BLD-IMP: POSITIVE — SIGNIFICANT CHANGE UP
T PALLIDUM AB TITR SER: NEGATIVE — SIGNIFICANT CHANGE UP
WBC # BLD: 6.25 K/UL — SIGNIFICANT CHANGE UP (ref 3.8–10.5)
WBC # FLD AUTO: 6.25 K/UL — SIGNIFICANT CHANGE UP (ref 3.8–10.5)

## 2019-08-23 RX ORDER — AMPICILLIN TRIHYDRATE 250 MG
1 CAPSULE ORAL EVERY 4 HOURS
Refills: 0 | Status: DISCONTINUED | OUTPATIENT
Start: 2019-08-23 | End: 2019-08-23

## 2019-08-23 RX ORDER — IBUPROFEN 200 MG
600 TABLET ORAL EVERY 6 HOURS
Refills: 0 | Status: COMPLETED | OUTPATIENT
Start: 2019-08-23 | End: 2020-07-21

## 2019-08-23 RX ORDER — DIBUCAINE 1 %
1 OINTMENT (GRAM) RECTAL EVERY 6 HOURS
Refills: 0 | Status: DISCONTINUED | OUTPATIENT
Start: 2019-08-23 | End: 2019-08-25

## 2019-08-23 RX ORDER — DIPHENHYDRAMINE HCL 50 MG
25 CAPSULE ORAL EVERY 6 HOURS
Refills: 0 | Status: DISCONTINUED | OUTPATIENT
Start: 2019-08-23 | End: 2019-08-25

## 2019-08-23 RX ORDER — OXYTOCIN 10 UNIT/ML
2 VIAL (ML) INJECTION
Qty: 30 | Refills: 0 | Status: DISCONTINUED | OUTPATIENT
Start: 2019-08-23 | End: 2019-08-24

## 2019-08-23 RX ORDER — CITRIC ACID/SODIUM CITRATE 300-500 MG
15 SOLUTION, ORAL ORAL EVERY 6 HOURS
Refills: 0 | Status: DISCONTINUED | OUTPATIENT
Start: 2019-08-23 | End: 2019-08-23

## 2019-08-23 RX ORDER — SIMETHICONE 80 MG/1
80 TABLET, CHEWABLE ORAL EVERY 4 HOURS
Refills: 0 | Status: DISCONTINUED | OUTPATIENT
Start: 2019-08-23 | End: 2019-08-25

## 2019-08-23 RX ORDER — DOCUSATE SODIUM 100 MG
100 CAPSULE ORAL
Refills: 0 | Status: DISCONTINUED | OUTPATIENT
Start: 2019-08-23 | End: 2019-08-25

## 2019-08-23 RX ORDER — KETOROLAC TROMETHAMINE 30 MG/ML
30 SYRINGE (ML) INJECTION ONCE
Refills: 0 | Status: DISCONTINUED | OUTPATIENT
Start: 2019-08-23 | End: 2019-08-23

## 2019-08-23 RX ORDER — HYDROCORTISONE 1 %
1 OINTMENT (GRAM) TOPICAL EVERY 6 HOURS
Refills: 0 | Status: DISCONTINUED | OUTPATIENT
Start: 2019-08-23 | End: 2019-08-25

## 2019-08-23 RX ORDER — LANOLIN
1 OINTMENT (GRAM) TOPICAL EVERY 6 HOURS
Refills: 0 | Status: DISCONTINUED | OUTPATIENT
Start: 2019-08-23 | End: 2019-08-25

## 2019-08-23 RX ORDER — SODIUM CHLORIDE 9 MG/ML
3 INJECTION INTRAMUSCULAR; INTRAVENOUS; SUBCUTANEOUS EVERY 8 HOURS
Refills: 0 | Status: DISCONTINUED | OUTPATIENT
Start: 2019-08-23 | End: 2019-08-25

## 2019-08-23 RX ORDER — BENZOCAINE 10 %
1 GEL (GRAM) MUCOUS MEMBRANE EVERY 6 HOURS
Refills: 0 | Status: DISCONTINUED | OUTPATIENT
Start: 2019-08-23 | End: 2019-08-25

## 2019-08-23 RX ORDER — MAGNESIUM HYDROXIDE 400 MG/1
30 TABLET, CHEWABLE ORAL
Refills: 0 | Status: DISCONTINUED | OUTPATIENT
Start: 2019-08-23 | End: 2019-08-25

## 2019-08-23 RX ORDER — TETANUS TOXOID, REDUCED DIPHTHERIA TOXOID AND ACELLULAR PERTUSSIS VACCINE, ADSORBED 5; 2.5; 8; 8; 2.5 [IU]/.5ML; [IU]/.5ML; UG/.5ML; UG/.5ML; UG/.5ML
0.5 SUSPENSION INTRAMUSCULAR ONCE
Refills: 0 | Status: DISCONTINUED | OUTPATIENT
Start: 2019-08-23 | End: 2019-08-25

## 2019-08-23 RX ORDER — AER TRAVELER 0.5 G/1
1 SOLUTION RECTAL; TOPICAL EVERY 4 HOURS
Refills: 0 | Status: DISCONTINUED | OUTPATIENT
Start: 2019-08-23 | End: 2019-08-25

## 2019-08-23 RX ORDER — IBUPROFEN 200 MG
600 TABLET ORAL EVERY 6 HOURS
Refills: 0 | Status: DISCONTINUED | OUTPATIENT
Start: 2019-08-23 | End: 2019-08-25

## 2019-08-23 RX ORDER — AMPICILLIN TRIHYDRATE 250 MG
2 CAPSULE ORAL ONCE
Refills: 0 | Status: COMPLETED | OUTPATIENT
Start: 2019-08-23 | End: 2019-08-23

## 2019-08-23 RX ORDER — GLYCERIN ADULT
1 SUPPOSITORY, RECTAL RECTAL AT BEDTIME
Refills: 0 | Status: DISCONTINUED | OUTPATIENT
Start: 2019-08-23 | End: 2019-08-25

## 2019-08-23 RX ORDER — ACETAMINOPHEN 500 MG
975 TABLET ORAL
Refills: 0 | Status: DISCONTINUED | OUTPATIENT
Start: 2019-08-23 | End: 2019-08-25

## 2019-08-23 RX ORDER — OXYTOCIN 10 UNIT/ML
333.33 VIAL (ML) INJECTION
Qty: 20 | Refills: 0 | Status: DISCONTINUED | OUTPATIENT
Start: 2019-08-23 | End: 2019-08-24

## 2019-08-23 RX ORDER — SODIUM CHLORIDE 9 MG/ML
1000 INJECTION, SOLUTION INTRAVENOUS
Refills: 0 | Status: DISCONTINUED | OUTPATIENT
Start: 2019-08-23 | End: 2019-08-23

## 2019-08-23 RX ORDER — PRAMOXINE HYDROCHLORIDE 150 MG/15G
1 AEROSOL, FOAM RECTAL EVERY 4 HOURS
Refills: 0 | Status: DISCONTINUED | OUTPATIENT
Start: 2019-08-23 | End: 2019-08-25

## 2019-08-23 RX ORDER — OXYCODONE HYDROCHLORIDE 5 MG/1
5 TABLET ORAL ONCE
Refills: 0 | Status: DISCONTINUED | OUTPATIENT
Start: 2019-08-23 | End: 2019-08-25

## 2019-08-23 RX ORDER — OXYCODONE HYDROCHLORIDE 5 MG/1
5 TABLET ORAL
Refills: 0 | Status: DISCONTINUED | OUTPATIENT
Start: 2019-08-23 | End: 2019-08-25

## 2019-08-23 RX ADMIN — SODIUM CHLORIDE 3 MILLILITER(S): 9 INJECTION INTRAMUSCULAR; INTRAVENOUS; SUBCUTANEOUS at 17:15

## 2019-08-23 RX ADMIN — OXYCODONE HYDROCHLORIDE 5 MILLIGRAM(S): 5 TABLET ORAL at 06:33

## 2019-08-23 RX ADMIN — Medication 600 MILLIGRAM(S): at 23:35

## 2019-08-23 RX ADMIN — SODIUM CHLORIDE 125 MILLILITER(S): 9 INJECTION, SOLUTION INTRAVENOUS at 03:41

## 2019-08-23 RX ADMIN — Medication 975 MILLIGRAM(S): at 23:35

## 2019-08-23 RX ADMIN — Medication 975 MILLIGRAM(S): at 11:30

## 2019-08-23 RX ADMIN — SODIUM CHLORIDE 3 MILLILITER(S): 9 INJECTION INTRAMUSCULAR; INTRAVENOUS; SUBCUTANEOUS at 06:26

## 2019-08-23 RX ADMIN — Medication 30 MILLIGRAM(S): at 06:00

## 2019-08-23 RX ADMIN — Medication 975 MILLIGRAM(S): at 18:15

## 2019-08-23 RX ADMIN — Medication 1000 MILLIUNIT(S)/MIN: at 07:34

## 2019-08-23 RX ADMIN — Medication 975 MILLIGRAM(S): at 23:06

## 2019-08-23 RX ADMIN — Medication 1000 MILLIUNIT(S)/MIN: at 05:28

## 2019-08-23 RX ADMIN — Medication 216 GRAM(S): at 03:19

## 2019-08-23 RX ADMIN — Medication 975 MILLIGRAM(S): at 18:41

## 2019-08-23 RX ADMIN — Medication 600 MILLIGRAM(S): at 23:06

## 2019-08-23 RX ADMIN — Medication 2 MILLIUNIT(S)/MIN: at 04:02

## 2019-08-23 RX ADMIN — Medication 30 MILLIGRAM(S): at 05:30

## 2019-08-23 RX ADMIN — Medication 975 MILLIGRAM(S): at 11:00

## 2019-08-23 RX ADMIN — OXYCODONE HYDROCHLORIDE 5 MILLIGRAM(S): 5 TABLET ORAL at 07:38

## 2019-08-23 RX ADMIN — Medication 1 TABLET(S): at 11:00

## 2019-08-23 NOTE — OB RN PATIENT PROFILE - NS_BABIESUTERO_OBGYN_ALL_OB_NU
Is This A New Presentation, Or A Follow-Up?: Skin Lesions
How Severe Is Your Skin Lesion?: moderate
Have Your Skin Lesions Been Treated?: not been treated
1

## 2019-08-23 NOTE — OB PROVIDER TRIAGE NOTE - NSOBPROVIDERNOTE_OBGYN_ALL_OB_FT
Pt of Dr. Hopson: 30yo  at 40.1wks, presents to triage with contractions. Denies any leakage of fluid or vaginal bleeding. Reports good fetal movement.    Allergies: denies  Meds: PNV  PMH: denies  PSH: D&C x 3  OBGYN  sabx2  TOP x 3 (D&C x 3)  -09, , FT, 7#6  -17, , FT, 7#8  -CUrrent AP complications: previously breech presentation, now vertex    Assessment/Plan    VS: /72, HR 85, T 36.8  Abd soft, nontender, gravid  SVE: /-2  TAS: vertex  EFW 3600g  NST: , moderate variability, accels, no decels  Ctx q 3-6min on toco    -Evidence of labor  -D/w Dr. Gee  -Routine labs  -Amp for GBS positive status  -Pt desires BTL  -Pt declines pain management at this time

## 2019-08-23 NOTE — DISCHARGE NOTE OB - PATIENT PORTAL LINK FT
You can access the [facility] Patient Portal, offered by [facility], by registering with the following website: [email or web address]

## 2019-08-23 NOTE — OB PROVIDER TRIAGE NOTE - NS_OBGYNHISTORY_OBGYN_ALL_OB_FT
TOM  sabx2  TOP x 3 (D&C x 3)  -09, MERVAT, FT, 7#6  -17, MERVAT, FT, 7#8  -CUrrent AP complications: previously breech presentation, now vertex

## 2019-08-23 NOTE — OB PROVIDER IHI INDUCTION/AUGMENTATION NOTE - NS_CHECKALL_OBGYN_ALL_OB
Order was written/Contractions pattern was reviewed/FHR was reviewed/H&P was completed/Induction / Augmentation was discussed

## 2019-08-23 NOTE — DISCHARGE NOTE OB - MEDICATION SUMMARY - MEDICATIONS TO TAKE
I will START or STAY ON the medications listed below when I get home from the hospital:  None I will START or STAY ON the medications listed below when I get home from the hospital:    acetaminophen 325 mg oral tablet  -- 3 tab(s) by mouth   -- Indication: For pain management as needed    ibuprofen 600 mg oral tablet  -- 1 tab(s) by mouth every 6 hours  -- Indication: For pain management as needed

## 2019-08-23 NOTE — OB PROVIDER H&P - ASSESSMENT
Pt of Dr. Hopson: 28yo  at 40.1wks, presents to triage with contractions. Denies any leakage of fluid or vaginal bleeding. Reports good fetal movement.    Allergies: denies  Meds: PNV  PMH: denies  PSH: D&C x 3  OBGYN  sabx2  TOP x 3 (D&C x 3)  -09, , FT, 7#6  -17, , FT, 7#8  -CUrrent AP complications: previously breech presentation, now vertex    Assessment/Plan    VS: /72, HR 85, T 36.8  Abd soft, nontender, gravid  SVE: /-2  TAS: vertex  EFW 3600g  NST: , moderate variability, accels, no decels  Ctx q 3-6min on toco    -Evidence of labor  -D/w Dr. Gee  -Routine labs  -Amp for GBS positive status  -Pt desires BTL  -Pt declines pain management at this time

## 2019-08-23 NOTE — OB RN TRIAGE NOTE - PMH
Normal spontaneous vaginal delivery  8/14/09  7-6  11/13/17  7-8  Spontaneous miscarriage  x3  Termination of pregnancy (fetus)  x2 D&C

## 2019-08-23 NOTE — DISCHARGE NOTE OB - CARE PLAN
Principal Discharge DX:	Normal spontaneous vaginal delivery  Goal:	recovery  Assessment and plan of treatment:	recovery

## 2019-08-23 NOTE — OB PROVIDER TRIAGE NOTE - HISTORY OF PRESENT ILLNESS
Pt of Dr. Hopson: 30yo  at 40.1wks, presents to triage with contractions. Denies any leakage of fluid or vaginal bleeding. Reports good fetal movement.    Allergies: denies  Meds: PNV  PMH: denies  PSH: D&C x 3  OBGYN  sabx2  TOP x 3 (D&C x 3)  -09, , FT, 7#6  -17, , FT, 7#8  -CUrrent AP complications: previously breech presentation, now vertex

## 2019-08-23 NOTE — OB PROVIDER TRIAGE NOTE - NSHPPHYSICALEXAM_GEN_ALL_CORE
VS: /72, HR 85, T 36.8  Abd soft, nontender, gravid  SVE: 6/90/-2  TAS: vertex  EFW 3600g  NST: , moderate variability, accels, no decels  Ctx q 3-6min on toco

## 2019-08-23 NOTE — DISCHARGE NOTE OB - CARE PROVIDER_API CALL
Roger Gee)  Gynecology Obstetrics  Gynecology  [address] [place] [address]  Phone: [phone]  Fax: [phone]  Follow Up Time:

## 2019-08-23 NOTE — DISCHARGE NOTE OB - MATERIALS PROVIDED
Spoke with Wife  Informed of information below  She verbalized understanding    Prescription sent   Shaken Baby Prevention Handout/[facility]  Screening Program/Breastfeeding Log/Back To Sleep Handout/Breastfeeding Guide and Packet/Tdap Vaccination (VIS Pub Date: 2012)/Vaccinations/Guide to Postpartum Care/[facility] Hearing Screen Program/Birth Certificate Instructions/Breastfeeding Mother’s Support Group Information/  Immunization Record

## 2019-08-23 NOTE — OB RN DELIVERY SUMMARY - NS_SEPSISRSKCALC_OBGYN_ALL_OB_FT
EOS calculated successfully. EOS Risk Factor: 0.5/1000 live births ([facility] national incidence); GA=40w1d; Temp=98.24; ROM=0.583; GBS='Positive'; Antibiotics='GBS specific antibiotics > 2 hrs prior to birth'

## 2019-08-24 RX ORDER — IBUPROFEN 200 MG
1 TABLET ORAL
Qty: 0 | Refills: 0 | DISCHARGE
Start: 2019-08-24

## 2019-08-24 RX ORDER — ACETAMINOPHEN 500 MG
3 TABLET ORAL
Qty: 0 | Refills: 0 | DISCHARGE
Start: 2019-08-24

## 2019-08-24 RX ADMIN — Medication 975 MILLIGRAM(S): at 05:55

## 2019-08-24 RX ADMIN — Medication 600 MILLIGRAM(S): at 12:06

## 2019-08-24 RX ADMIN — SODIUM CHLORIDE 3 MILLILITER(S): 9 INJECTION INTRAMUSCULAR; INTRAVENOUS; SUBCUTANEOUS at 13:50

## 2019-08-24 RX ADMIN — Medication 600 MILLIGRAM(S): at 12:36

## 2019-08-24 RX ADMIN — Medication 600 MILLIGRAM(S): at 17:51

## 2019-08-24 RX ADMIN — Medication 600 MILLIGRAM(S): at 05:25

## 2019-08-24 RX ADMIN — Medication 600 MILLIGRAM(S): at 05:55

## 2019-08-24 RX ADMIN — Medication 600 MILLIGRAM(S): at 17:21

## 2019-08-24 RX ADMIN — Medication 975 MILLIGRAM(S): at 05:25

## 2019-08-24 NOTE — PROGRESS NOTE ADULT - ASSESSMENT
ppd1 s/p  recovering well   [] continue routine postpartum care   [] encourage ambulation   [] continue pain management PRN   [] discharge planning

## 2019-08-24 NOTE — PROGRESS NOTE ADULT - SUBJECTIVE AND OBJECTIVE BOX
INTERVAL HPI/OVERNIGHT EVENTS: Pt seen and examined at bedside.  Doing well, denies complaints. +voiding without difficulty, +ambulation, salas reg diet. denies heavy lochia     MEDICATIONS  (STANDING):  acetaminophen   Tablet .. 975 milliGRAM(s) Oral <User Schedule>  diphtheria/tetanus/pertussis (acellular) Vaccine (ADAcel) 0.5 milliLiter(s) IntraMuscular once  ibuprofen  Tablet. 600 milliGRAM(s) Oral every 6 hours  prenatal multivitamin 1 Tablet(s) Oral daily  sodium chloride 0.9% lock flush 3 milliLiter(s) IV Push every 8 hours    MEDICATIONS  (PRN):  benzocaine 20%/menthol 0.5% Spray 1 Spray(s) Topical every 6 hours PRN for Perineal discomfort  dibucaine 1% Ointment 1 Application(s) Topical every 6 hours PRN Perineal discomfort  diphenhydrAMINE 25 milliGRAM(s) Oral every 6 hours PRN Pruritus  docusate sodium 100 milliGRAM(s) Oral two times a day PRN For stool softening  glycerin Suppository - Adult 1 Suppository(s) Rectal at bedtime PRN Constipation  hydrocortisone 1% Cream 1 Application(s) Topical every 6 hours PRN Moderate Pain (4-6)  lanolin Ointment 1 Application(s) Topical every 6 hours PRN nipple soreness  magnesium hydroxide Suspension 30 milliLiter(s) Oral two times a day PRN Constipation  oxyCODONE    IR 5 milliGRAM(s) Oral every 3 hours PRN Moderate to Severe Pain (4-10)  oxyCODONE    IR 5 milliGRAM(s) Oral once PRN Moderate to Severe Pain (4-10)  pramoxine 1%/zinc 5% Cream 1 Application(s) Topical every 4 hours PRN Moderate Pain (4-6)  simethicone 80 milliGRAM(s) Chew every 4 hours PRN Gas  witch hazel Pads 1 Application(s) Topical every 4 hours PRN Perineal discomfort      Vital Signs Last 24 Hrs  T(C): 36.6 (24 Aug 2019 06:11), Max: 36.9 (23 Aug 2019 13:02)  T(F): 97.9 (24 Aug 2019 06:11), Max: 98.4 (23 Aug 2019 13:02)  HR: 71 (24 Aug 2019 06:11) (70 - 71)  BP: 119/76 (24 Aug 2019 06:11) (112/72 - 119/76)  BP(mean): --  RR: 18 (24 Aug 2019 06:11) (18 - 18)  SpO2: 100% (24 Aug 2019 06:11) (95% - 100%)    PHYSICAL EXAM:    GA: NAD, A+0 x 3  Abd: ( + ) BS, soft, nontender, nondistended, no rebound or guarding,   Uterus: Fundus midline; firm    LABS:                        11.2   6.25  )-----------( 344      ( 23 Aug 2019 03:00 )             35.4

## 2019-08-25 VITALS
DIASTOLIC BLOOD PRESSURE: 71 MMHG | TEMPERATURE: 98 F | RESPIRATION RATE: 16 BRPM | SYSTOLIC BLOOD PRESSURE: 111 MMHG | OXYGEN SATURATION: 100 % | HEART RATE: 79 BPM

## 2019-08-25 RX ADMIN — Medication 975 MILLIGRAM(S): at 06:05

## 2019-08-25 RX ADMIN — Medication 600 MILLIGRAM(S): at 06:05

## 2019-08-25 RX ADMIN — Medication 975 MILLIGRAM(S): at 05:38

## 2019-08-25 RX ADMIN — Medication 600 MILLIGRAM(S): at 05:37

## 2019-12-19 NOTE — ED ADULT NURSE NOTE - BP NONINVASIVE SYSTOLIC (MM HG)
Bed: 11  Expected date: 12/19/19  Expected time:   Means of arrival: [facility]  Comments:  71 yr female   Cc: Abd pain/vomit  Onset 2100   GCS 15     211/123  HR 88  98 RA        4mg of zofran   R AC 18 IV 119

## 2020-02-01 NOTE — OB RN PATIENT PROFILE - PARENTS VERBALIZED UNDERSTANDING OF THE SAFE SKIN TO SKIN POSITIONING OF THE NEWBORN.
Patient:   MAC GARCIA            MRN: [id number]            FIN: [id number]              Age:   79 years     Sex:  FEMALE     :  41   Associated Diagnoses:   None   Author:   JOMAR BRUNO     HISTORY AND PHYSICAL  PCP: Unknown  Chief Complaint: Headache  History of Present Illness:  79-year-old female history of brain tumor per chart, subdural hematomas, hypertension, dyslipidemia presents to hospital for evaluation of headache.  Patient unable to provide much history at this time, history mostly from available medical record medical records and discussion with the ER physician and RN.  Apparently had a headache for about 2 to 3 weeks, worsening with the past 2days.  Location is frontal worse behind left eye.  Worsen with  lying flat.  Associate with nausea and dizziness.  No associated vomiting.  No associated visual changes, diplopia, dysarthria.  Per medical records she has a history of brain tumor.  Precise etiology not clear.  She tried Norco at home without improvement.  She comes hospital for evaluation.  She had a CT head in the ER that showed evidence of prior bilateral frontal craniotomies, no space-occupying lesion or acute pathology.  She was given a  headache cocktail with Compazine and Benadryl earlier today.  She is admitted for further work-up with an MRI of the brain and MRA of the brain.  My examination she is currently sleeping.  She arouses easily easily to voice but falls back asleep within a few seconds.  She answers questions intermittently but is at this time is unreliable.  Per nurse her headache is resolved and her only complaint at this time is dizziness.  MRI and MRA have  been done and are reassuring.  No enhancing lesions, no aneurysm or AVM, normal appearance of cerebral venous sinuses    Review of Systems:   Unable to obtain as patient not answering reliably  Past Medical History:  Brain tumor, possible  COPD (Chronic Obstructive Pulmonary Disease)  Assessment Test scale  H/O: blood transfusion  High cholesterol  Hypertension  Subdural hematoma  Past Surgical History:  Bilateral frontal craniotomies  Family History:   Unknown, patient unreliable historian at present  Social History:  Alcohol  Details: Use: Current.  Tobacco  Details: Smoker in [place]: No.  Smoked/Smokeless Tobacco Last 30 Days: No.  Smoking Tobacco Use: Former smoker.  Smokeless Tobacco Use Never.  Home Medicaions  Home Medications (4) Active  calcium (as carbonate)-vitamin D oral 600 mg-400 unit tablet 1 tab, Oral, Daily  Centrum 1 tab, Oral, Daily  Lipitor oral 10 mg tablet 10 mg = 1 tab, Oral, Daily  Toprol XL (succinate) oral 25 mg tablet 25 mg = 1 tab, Oral, BID  Allergies  Allergies (1) Active Reaction  SULFA, None Documented  Immunizations:    No vaccines orders entered during this encounter  CODE STATUS: Unknown   I/O:  I & O between:  30-JAN-2020 22:15 TO 31-JAN-2020 22:15  Med Dosing Weight:  62.1  kg   31-JAN-2020  24 Hour Intake:   100.00  ( 1.61 mL/kg )  24 Hour Output:   0.00           24 Hour Urine/Stool Output:   0.0  24 Hour Balance:   100.00           24 Hour Urine Output:   0.00  ( 0.00 mL/kg/hr )                   Urine Count:  1     Vitals:  Vitals between:   30-JAN-2020 22:15:50   TO   31-JAN-2020 22:15:50                   LAST RESULT MINIMUM MAXIMUM  Temperature 36.3 36.3 36.7  Heart Rate 65 57 68  Respiratory Rate 16 16 20  NISBP           115 115 157  NIDBP           56 56 87  NIMBP           76 76 110  SpO2                    97 95 100  Physical Exam:  gen sleeping, easily arouses to voice, engages briefly in conversation but then falls back asleep  eyes anicteric  ENT mmm  CV rrr, s1s2+, no LE edema  pulm ctab  GI soft, nt, nd, bs+  skin warm and dry  MSK motor grossly intact  neuro speech is fluent.  Follows simple commands  Laboratory:  Labs between:  30-JAN-2020 22:15 to 31-JAN-2020 22:15  CBC:                 WBC  HgB  Hct  Plt  MCV  RDW   31-JAN-2020 4.8  13.5   42.7  228  90.3  13.4   DIFF:                 Seg  Neutroph//ABS  Lymph//ABS  Mono//ABS  EOS/ABS  31-JAN-2020 NOT APPLICABLE  71 // 3.4 20 // 1.0 7 // 0.3 1 // 0.1  BMP:                 Na  Cl  BUN  Glu   31-JAN-2020 139  106  (H) 25  (H) 103                              K  CO2  Cr  Ca                              4.2  30  0.69  8.8   COAG:                 INR  PT  PTT  Ddimer  Fibrinogen    31-JAN-2020 1.1  11.3  27                      Cardiovascular Labs:  Cardiovascular Labs    Troponin <0.02 ng/mL (01/31/20 12:05)   ID Labs:  ID Labs   No lab results for ID labs found in the previous 24 hours.   Neurological Labs:  Neurological Labs   No neurological lab results were found over the previous 24 hours.  Radiology:   Result title:  XR CHEST 1V  Result status:  Final  Verified by:  ELYSSA JUAREZ on 01/31/2020 2:47  Impression: Under aerated exam.  Findings of obstructive airways disease.  Ill-defined right apical opacity, indeterminate. Repeat evaluation with inspiratory PA and lateral views should be considered.  Echocardiogram Results:  Echocardiogram Results  No Results Have Been Found  Assessment/Plan:  Headache  Work-up with MRI, MRA brain is negative.  Possibly tension headache  Status post headache cocktail with Compazine and Benadryl and currently resolved  No further work-up planned  Patient has been admitted for work-up of above, we will continue overnight observation in hospital for symptom management of dizziness  Dizziness  No clear reason based on her MR I of the brain and MRA.  No acute stroke or intracranial mass.  No significant carotid stenosis or vertebrobasilar insufficiency.  BPPV or vertiginous migraine are possibilities  Monitor symptoms  Hypertension  Continue metoprolol  Dyslipidemia  Continue Lipitor  Medications Active:    Medications (5) Active  Scheduled: (2)  Atorvastatin 10 mg tab  10 mg 1 tab, Oral, Daily  Metoprolol succinate 25 mg XL tab  25 mg 1 tab, Oral, Q12H  Continuous:  (0)  PRN: (3)  Acetaminophen 325 mg tab  650 mg 2 tab, Oral, Q6H  Docusate sodium 100 mg cap  100 mg 1 cap, Oral, BID  Senna-docusate sodium 8.6-50 mg tab  1 tab, Oral, Q Bedtime  DVT ppx:  SCD boots    PCP Notification: PCP unknown   Statement Selected

## 2021-10-18 NOTE — OB RN TRIAGE NOTE - NS PRO ABUSE SCREEN SUSPICION NEGLECT YN
"Chief Complaint   Patient presents with   • Postpartum Care       6 Week Postpartum Visit         Sima Booker is a 32 y.o.  s/p , due to failure to progress at 37 weeks on 2021, who presents today for a 6 week postpartum check.      At the time of delivery were you diagnosed with any of the following: Gestational hypertension. The incision and is healing well.    Patient denies postpartum depression.  Patient describes bleeding as absent.  Patient is bottle feeding.  Desires contraceptive methods: None for contraception.  Patient denies bowel or bladder issues.    Postpartum Depression Screening Questionnaire: 4, No treatment indicated.  Baby Name: Kenney  Baby Weight: 7lb    Baby Discharged: Discharged with Mom  Delivering Physician: César    Last Completed Pap Smear          PAP SMEAR (Every 3 Years) Next due on 2021  Pap IG, Ct-Ng, HPV-hr    10/25/2018  Done - neg              Is the patient due for a pap today? No    The additional following portions of the patient's history were reviewed and updated as appropriate: allergies, current medications, past family history, past medical history, past social history, past surgical history and problem list.    Review of Systems   Constitutional: Negative for fever.   Eyes: Negative for blurred vision.   Cardiovascular: Negative for leg swelling.   Gastrointestinal: Negative for nausea and vomiting.   Genitourinary: Negative for vaginal bleeding and vaginal discharge.   Neurological: Negative for headache.     All other systems reviewed and are negative.     I have reviewed and agree with the HPI, ROS, and historical information as entered above. Hue Patel MD    /80   Ht 162.6 cm (64\")   Wt 79.4 kg (175 lb)   BMI 30.04 kg/m²     Physical Exam  Vitals and nursing note reviewed. Exam conducted with a chaperone present.   Constitutional:       Appearance: She is well-developed.   HENT:      Head: Normocephalic " and atraumatic.   Neck:      Thyroid: No thyroid mass or thyromegaly.   Pulmonary:      Breath sounds: No rhonchi.   Abdominal:      Palpations: Abdomen is soft. Abdomen is not rigid. There is no mass.      Tenderness: There is no abdominal tenderness. There is no guarding.      Hernia: No hernia is present.      Comments: Incision C/D/I   Genitourinary:     Vagina: Normal.      Cervix: Normal.      Uterus: Normal.    Musculoskeletal:      Cervical back: Normal range of motion. No muscular tenderness.   Neurological:      Mental Status: She is alert and oriented to person, place, and time.   Psychiatric:         Behavior: Behavior normal.             Assessment and Plan    Problem List Items Addressed This Visit     None      Visit Diagnoses     Postpartum exam    -  Primary          1. S/p , 6 weeks postpartum.  Doing well.    2. Return to normal physical activity.  No pelvic restrictions.   3. Contraception: contraceptive methods: None  4. Return in about 1 year (around 10/18/2022) for Annual physical.     Hue Patel MD  10/18/2021     no

## 2023-05-05 PROBLEM — Z33.2 ENCOUNTER FOR ELECTIVE TERMINATION OF PREGNANCY: Chronic | Status: ACTIVE | Noted: 2019-08-23

## 2023-05-05 PROBLEM — O03.9 COMPLETE OR UNSPECIFIED SPONTANEOUS ABORTION WITHOUT COMPLICATION: Chronic | Status: ACTIVE | Noted: 2019-08-23

## 2023-05-17 ENCOUNTER — APPOINTMENT (OUTPATIENT)
Dept: PULMONOLOGY | Facility: CLINIC | Age: 33
End: 2023-05-17
Payer: MEDICAID

## 2023-05-17 VITALS
BODY MASS INDEX: 32.2 KG/M2 | HEART RATE: 80 BPM | WEIGHT: 230 LBS | TEMPERATURE: 97.2 F | HEIGHT: 71 IN | SYSTOLIC BLOOD PRESSURE: 120 MMHG | OXYGEN SATURATION: 94 % | DIASTOLIC BLOOD PRESSURE: 88 MMHG

## 2023-05-17 DIAGNOSIS — Z86.59 PERSONAL HISTORY OF OTHER MENTAL AND BEHAVIORAL DISORDERS: ICD-10-CM

## 2023-05-17 DIAGNOSIS — R06.00 DYSPNEA, UNSPECIFIED: ICD-10-CM

## 2023-05-17 PROCEDURE — 94729 DIFFUSING CAPACITY: CPT

## 2023-05-17 PROCEDURE — 94010 BREATHING CAPACITY TEST: CPT

## 2023-05-17 PROCEDURE — 99204 OFFICE O/P NEW MOD 45 MIN: CPT | Mod: 25

## 2023-05-17 PROCEDURE — 94727 GAS DIL/WSHOT DETER LNG VOL: CPT

## 2023-05-17 RX ORDER — SEMAGLUTIDE 1.34 MG/ML
2 INJECTION, SOLUTION SUBCUTANEOUS
Refills: 0 | Status: ACTIVE | COMMUNITY

## 2023-05-17 RX ORDER — FLUOXETINE HYDROCHLORIDE 60 MG/1
TABLET ORAL
Refills: 0 | Status: ACTIVE | COMMUNITY

## 2023-05-17 RX ORDER — FLUTICASONE PROPIONATE AND SALMETEROL 500; 50 UG/1; UG/1
POWDER RESPIRATORY (INHALATION)
Refills: 0 | Status: ACTIVE | COMMUNITY

## 2023-05-17 RX ORDER — ALBUTEROL 90 MCG
90 AEROSOL (GRAM) INHALATION
Refills: 0 | Status: ACTIVE | COMMUNITY

## 2023-05-17 NOTE — HISTORY OF PRESENT ILLNESS
[Never] : never [TextBox_4] : 5/17/2023 Angie Brown is a 33-year-old female with history of dyspnea.  Patient states she has been short of breath over the last several months.  Patient states she has shortness of breath all the time.  Patient states she is got shortness of breath even at rest.  Patient states she has no associated cough wheeze or sputum production.  Patient does feel weakness associated with the shortness of breath.  Patient has never been a smoker and has no known respiratory ailments such as asthma or pneumonia.  The patient has had previous medical work-ups cardiac and neurological.  She is going to try to repeat them once again.  Patient describes her chest discomfort as being dull and lasting up to 30 minutes.  Patient is on Ozempic to lose weight.  Patient has gained 35 pounds in the last year.

## 2023-05-17 NOTE — REASON FOR VISIT
[Initial] : an initial visit [Chest Pain] : chest pain [TextBox_44] : PFT performed today 5/17/2023. CT and CXR performed last May at [facility], that is when she was told she needed to see a pulmonologist  Pt states she has chest pain from PFT. No other pulmonary issues at this time.

## 2023-05-17 NOTE — ASSESSMENT
[FreeTextEntry1] : 5/17/2023 shira Gage is a 33-year-old female with shortness of breath.  The patient's CAT scan was reviewed and her PFTs were reviewed.  The CAT scan is normal with no pulmonary abnormalities.  The pulmonary function tests are mostly at the lower limits of normal.  Patient's PFTs were normal at the lower limits of normal.  There was mild mechanical restriction.  Patient also has a mixed density large thyroid.  Patient is pretty sure that her thyroid functions came back normal.  The patient's clinical picture does not fit dyspnea based on lung disease.  Patient's shortness of breath is his throughout all her activities globally.  Which is not the way dyspnea from pulmonary disease presents itself.  Patient may have a combination of different organ system diseases she is getting evaluations from her cardiologist and her endocrinologist.  States that as far as she knows thyroid testing was within normal limits.  I plan to do a 6-minute walk with the patient in the next 2 days to assess her endurance oxygen level and dyspnea.  Time spent 45 minutes counseling, education, documentation, imaging reviewed, medication reviewed,  old records reviewed, HX and PE

## 2023-05-17 NOTE — REVIEW OF SYSTEMS
[Recent Wt Gain (___ Lbs)] : ~T recent [unfilled] lb weight gain [Sinus Problems] : sinus problems [Dyspnea] : dyspnea [SOB on Exertion] : sob on exertion [Chest Discomfort] : chest discomfort [Diarrhea] : diarrhea [Headache] : headache [Dizziness] : dizziness [Numbness] : numbness [Memory Loss] : memory loss [Anxiety] : anxiety [Panic Attacks] : panic attacks [Thyroid Problem] : thyroid problem [Negative] : Dermatologic [Cough] : no cough [Sputum] : no sputum [Seasonal Allergies] : no seasonal allergies [GERD] : no gerd [Constipation] : no constipation [Hepatic Disease] : no hepatic disease [Arthralgias] : no arthralgias [Myalgias] : no myalgias [Back Pain] : no back pain [Anemia] : no anemia [Depression] : no depression [Diabetes] : no diabetes [Obesity] : no obesity [TextBox_3] : gained 35lbs in one year   [TextBox_44] : dull chest pain  up to thirty minutes   [TextBox_69] : etoh socially  [TextBox_122] : neurologist  in the past for migraine's and  had calcium build up in her [place]  [TextBox_144] : enlarged throid

## 2024-09-09 NOTE — OB RN PATIENT PROFILE - EXTENSIONS OF SELF_ADULT
[de-identified] : Right forearm 2 view radiographs were obtained and independently reviewed during today's visit. Continued visualization of a radius and ulna shaft fracture in acceptable alignment. Now with progressive signs of interval healing and callus formation.
None

## 2024-09-19 NOTE — OB PROVIDER H&P - CURRENT PREGNANCY COMPLICATIONS, OB PROFILE
Maternal Positive GBS
Airway patent, Nasal mucosa clear. Mouth with normal mucosa. Throat has no vesicles, no oropharyngeal exudates and uvula is midline.